# Patient Record
Sex: MALE | Race: OTHER | Employment: FULL TIME | URBAN - NONMETROPOLITAN AREA
[De-identification: names, ages, dates, MRNs, and addresses within clinical notes are randomized per-mention and may not be internally consistent; named-entity substitution may affect disease eponyms.]

---

## 2019-10-21 ENCOUNTER — HOSPITAL ENCOUNTER (EMERGENCY)
Age: 29
Discharge: HOME | End: 2019-10-21
Attending: EMERGENCY MEDICINE
Payer: COMMERCIAL

## 2019-10-21 VITALS
SYSTOLIC BLOOD PRESSURE: 117 MMHG | WEIGHT: 155 LBS | HEIGHT: 68 IN | TEMPERATURE: 98.4 F | DIASTOLIC BLOOD PRESSURE: 85 MMHG | BODY MASS INDEX: 23.49 KG/M2 | OXYGEN SATURATION: 98 % | RESPIRATION RATE: 16 BRPM | HEART RATE: 76 BPM

## 2019-10-21 DIAGNOSIS — S05.02XA ABRASION OF LEFT CORNEA, INITIAL ENCOUNTER: Primary | ICD-10-CM

## 2019-10-21 PROCEDURE — 2500000003 HC RX 250 WO HCPCS

## 2019-10-21 PROCEDURE — 99283 EMERGENCY DEPT VISIT LOW MDM: CPT

## 2019-10-21 RX ORDER — PROPARACAINE HYDROCHLORIDE 5 MG/ML
1 SOLUTION/ DROPS OPHTHALMIC ONCE
Status: DISCONTINUED | OUTPATIENT
Start: 2019-10-21 | End: 2019-10-21 | Stop reason: HOSPADM

## 2019-10-21 RX ORDER — PROPARACAINE HYDROCHLORIDE 5 MG/ML
SOLUTION/ DROPS OPHTHALMIC
Status: COMPLETED
Start: 2019-10-21 | End: 2019-10-21

## 2019-10-21 RX ADMIN — PROPARACAINE HYDROCHLORIDE: 5 SOLUTION/ DROPS OPHTHALMIC at 15:05

## 2019-10-21 SDOH — HEALTH STABILITY: MENTAL HEALTH: HOW OFTEN DO YOU HAVE A DRINK CONTAINING ALCOHOL?: NEVER

## 2019-10-21 ASSESSMENT — PAIN DESCRIPTION - PAIN TYPE: TYPE: ACUTE PAIN

## 2019-10-21 ASSESSMENT — PAIN DESCRIPTION - ORIENTATION: ORIENTATION: LEFT

## 2019-10-21 ASSESSMENT — PAIN DESCRIPTION - FREQUENCY: FREQUENCY: INTERMITTENT

## 2019-10-21 ASSESSMENT — PAIN DESCRIPTION - DESCRIPTORS: DESCRIPTORS: BURNING

## 2019-10-21 ASSESSMENT — PAIN DESCRIPTION - LOCATION: LOCATION: EYE

## 2019-10-21 ASSESSMENT — PAIN SCALES - GENERAL: PAINLEVEL_OUTOF10: 3

## 2021-08-26 ENCOUNTER — HOSPITAL ENCOUNTER (EMERGENCY)
Age: 31
Discharge: HOME | End: 2021-08-26
Attending: EMERGENCY MEDICINE
Payer: COMMERCIAL

## 2021-08-26 VITALS
WEIGHT: 160 LBS | SYSTOLIC BLOOD PRESSURE: 129 MMHG | HEIGHT: 67 IN | HEART RATE: 94 BPM | OXYGEN SATURATION: 98 % | DIASTOLIC BLOOD PRESSURE: 85 MMHG | BODY MASS INDEX: 25.11 KG/M2 | TEMPERATURE: 98.4 F | RESPIRATION RATE: 16 BRPM

## 2021-08-26 DIAGNOSIS — U07.1 COVID-19: Primary | ICD-10-CM

## 2021-08-26 LAB — SARS-COV-2, NAAT: DETECTED

## 2021-08-26 PROCEDURE — 99284 EMERGENCY DEPT VISIT MOD MDM: CPT

## 2021-08-26 PROCEDURE — 87635 SARS-COV-2 COVID-19 AMP PRB: CPT

## 2021-08-26 ASSESSMENT — PAIN DESCRIPTION - ONSET: ONSET: GRADUAL

## 2021-08-26 ASSESSMENT — PAIN SCALES - GENERAL: PAINLEVEL_OUTOF10: 3

## 2021-08-26 ASSESSMENT — PAIN DESCRIPTION - LOCATION: LOCATION: GENERALIZED;HEAD

## 2021-08-26 ASSESSMENT — ENCOUNTER SYMPTOMS
WHEEZING: 0
SORE THROAT: 1
NAUSEA: 0
COUGH: 1
DIARRHEA: 0
SHORTNESS OF BREATH: 0
ABDOMINAL PAIN: 0

## 2021-08-26 ASSESSMENT — PAIN DESCRIPTION - DESCRIPTORS: DESCRIPTORS: ACHING

## 2021-08-26 ASSESSMENT — PAIN DESCRIPTION - PAIN TYPE: TYPE: ACUTE PAIN

## 2021-08-26 NOTE — ED PROVIDER NOTES
[facility]  eMERGENCY dEPARTMENT eNCOUnter             Gregorio Pagan 20 COMPLAINT    Chief Complaint   Patient presents with    Generalized Body Aches    Headache    Cough    Pharyngitis       Nurses Notes reviewed and I agree except as noted in the HPI. HPI    Hilda Pierce is a 28 y.o. male who presents with a complaint of a 1 day history of body aches, headache, cough, mild sore throat, generalized malaise. His girlfriend is currently quarantined at home with him because she has COVID-19. He is concerned he also has COVID-19. He has not been to work all week, because of her quarantine. Current pain level is 3/10, aching, constant, primarily in his head. Tylenol is effective. No nausea, vomiting. He is tolerating fluids. He has not been immunized against COVID-19. REVIEW OF SYSTEMS      Review of Systems   Constitutional: Positive for malaise/fatigue. Negative for fever. HENT: Positive for congestion and sore throat. Respiratory: Positive for cough. Negative for shortness of breath and wheezing. Cardiovascular: Negative for chest pain and palpitations. Gastrointestinal: Negative for abdominal pain, diarrhea and nausea. Musculoskeletal: Positive for myalgias. Neurological: Positive for weakness and headaches. Negative for dizziness and focal weakness. All other systems reviewed and are negative. PAST MEDICAL HISTORY     has no past medical history on file. SURGICAL HISTORY     has no past surgical history on file. CURRENT MEDICATIONS    Previous Medications    No medications on file       ALLERGIES    has No Known Allergies. FAMILY HISTORY    has no family status information on file. family history is not on file. SOCIAL HISTORY     reports that he has never smoked. He has never used smokeless tobacco. He reports that he does not drink alcohol.     PHYSICAL EXAM       INITIAL VITALS: /85   Pulse 94   Temp 98.4 °F (36.9 °C) (Oral)   Resp 16   Ht 5' 7\" (1.702 m)   Wt 160 lb (72.6 kg)   SpO2 98%   BMI 25.06 kg/m²      Physical Exam  Vitals and nursing note reviewed. Constitutional:       General: He is not in acute distress. Appearance: He is well-developed. He is not toxic-appearing. HENT:      Nose: No congestion or rhinorrhea. Mouth/Throat:      Mouth: Mucous membranes are moist.      Pharynx: Oropharynx is clear. No oropharyngeal exudate or posterior oropharyngeal erythema. Eyes:      Conjunctiva/sclera: Conjunctivae normal.      Pupils: Pupils are equal, round, and reactive to light. Cardiovascular:      Rate and Rhythm: Normal rate and regular rhythm. Heart sounds: No murmur heard. Pulmonary:      Effort: Pulmonary effort is normal. No respiratory distress. Breath sounds: Normal breath sounds. No wheezing. Abdominal:      General: Bowel sounds are normal.      Palpations: Abdomen is soft. There is no mass. Tenderness: There is no abdominal tenderness. Musculoskeletal:      Cervical back: Neck supple. Lymphadenopathy:      Cervical: No cervical adenopathy. Skin:     General: Skin is warm and dry. Findings: No erythema or rash. Neurological:      General: No focal deficit present. Mental Status: He is alert and oriented to person, place, and time. Psychiatric:         Behavior: Behavior normal.          LABS:     Labs Reviewed   COVID-19, RAPID - Abnormal; Notable for the following components:       Result Value    SARS-CoV-2, NAAT DETECTED (*)     All other components within normal limits       Vitals:    Vitals:    08/26/21 1214   BP: 129/85   Pulse: 94   Resp: 16   Temp: 98.4 °F (36.9 °C)   TempSrc: Oral   SpO2: 98%   Weight: 160 lb (72.6 kg)   Height: 5' 7\" (1.702 m)       EMERGENCY DEPARTMENT COURSE:    Positive test, general measures discussed. Warning signs and symptoms discussed. Note given for work.  He does not meet criteria for monoclonal antibody infusion.         FINAL IMPRESSION      1. COVID-19        DISPOSITION/PLAN    DISPOSITION Decision To Discharge 08/26/2021 12:40:04 PM      PATIENT REFERRED TO:    [place] [id number]  [phone]    As needed      DISCHARGE MEDICATIONS:    New Prescriptions    No medications on file          (Please note that portions of this note were completed with a voice recognition program.  Efforts were made to edit the dictations but occasionally words are mis-transcribed.)      Grace Cronin MD  08/26/21 [id number]

## 2021-08-26 NOTE — ED TRIAGE NOTES
Arrives to [facility] for the evaluation of generalized body aches, headache, cough, and sore throat. States his symptoms started today but has been exposed to a friend who tested positive for COVID 3 days ago. Took tylenol this morning around 0830 which was helpful for pain relief. Denies fever, chills, SOB, N/V, diarrhea. Plan to collect COVID swab. Resting on cot. Call light in reach.

## 2021-08-27 ENCOUNTER — CARE COORDINATION (OUTPATIENT)
Dept: CARE COORDINATION | Age: 31
End: 2021-08-27

## 2021-08-30 NOTE — CARE COORDINATION
Final attempt to reach patient for covid-19 f/u s/p recent [facility] ED visit for c/o body aches, headache, and cough s/p recent COVID-19 exposure. COVID-19 testing was completed and results were positive. Patient was not available at the time of my call and generic voicemail message was left asking patient to please return call to my direct number. If no response received no further f/u planned and Episode of Care resolved.